# Patient Record
Sex: MALE
[De-identification: names, ages, dates, MRNs, and addresses within clinical notes are randomized per-mention and may not be internally consistent; named-entity substitution may affect disease eponyms.]

---

## 2023-03-08 ENCOUNTER — NURSE TRIAGE (OUTPATIENT)
Dept: OTHER | Facility: CLINIC | Age: 18
End: 2023-03-08

## 2023-03-09 NOTE — TELEPHONE ENCOUNTER
Location of patient: [place]    Subjective: Caller states \"heart palpitations\"     Current Symptoms: heart palpitations when in certain positions    Associated Symptoms:  none    Pain Severity: none    Temperature: none    What has been tried: nothing    Recommended disposition: See HCP within 4 hours    Care advice provided, patient verbalizes understanding; denies any other questions or concerns.     Outcome: Patient/caller agrees to proceed to nearest Emergency Department      This triage is a result of a call to the [address] [place] [address]      Reason for Disposition   [1] Skipped or extra beat(s) AND [2] increases with exercise or exertion    Protocols used: Heart Rate and Heartbeat Questions-ADULT-AH